# Patient Record
Sex: FEMALE | Race: WHITE | NOT HISPANIC OR LATINO | Employment: FULL TIME | URBAN - METROPOLITAN AREA
[De-identification: names, ages, dates, MRNs, and addresses within clinical notes are randomized per-mention and may not be internally consistent; named-entity substitution may affect disease eponyms.]

---

## 2019-01-31 ENCOUNTER — HISTORICAL (OUTPATIENT)
Dept: ADMINISTRATIVE | Facility: HOSPITAL | Age: 44
End: 2019-01-31

## 2019-01-31 LAB
ABS NEUT (OLG): 4 X10(3)/MCL (ref 2.1–9.2)
ALBUMIN SERPL-MCNC: 4.3 GM/DL (ref 3.4–5)
ALBUMIN/GLOB SERPL: 1.59 {RATIO} (ref 1.5–2.5)
ALP SERPL-CCNC: 26 UNIT/L (ref 38–126)
ALT SERPL-CCNC: 7 UNIT/L (ref 7–52)
APPEARANCE, UA: CLEAR
AST SERPL-CCNC: 21 UNIT/L (ref 15–37)
BACTERIA #/AREA URNS AUTO: NORMAL /HPF
BILIRUB SERPL-MCNC: 0.5 MG/DL (ref 0.2–1)
BILIRUB UR QL STRIP: NEGATIVE MG/DL
BILIRUBIN DIRECT+TOT PNL SERPL-MCNC: 0.1 MG/DL (ref 0–0.5)
BILIRUBIN DIRECT+TOT PNL SERPL-MCNC: 0.4 MG/DL
BUN SERPL-MCNC: 9 MG/DL (ref 7–18)
CALCIUM SERPL-MCNC: 9 MG/DL (ref 8.5–10)
CHLORIDE SERPL-SCNC: 103 MMOL/L (ref 98–107)
CHOLEST SERPL-MCNC: 188 MG/DL (ref 0–200)
CHOLEST/HDLC SERPL: 2.4 {RATIO}
CO2 SERPL-SCNC: 28 MMOL/L (ref 21–32)
COLOR UR: NORMAL
CREAT SERPL-MCNC: 0.79 MG/DL (ref 0.6–1.3)
DEPRECATED CALCIDIOL+CALCIFEROL SERPL-MC: 31.6 NG/ML (ref 30–80)
ERYTHROCYTE [DISTWIDTH] IN BLOOD BY AUTOMATED COUNT: 11.9 % (ref 11.5–17)
GLOBULIN SER-MCNC: 2.7 GM/DL (ref 1.2–3)
GLUCOSE (UA): NEGATIVE MG/DL
GLUCOSE SERPL-MCNC: 98 MG/DL (ref 74–106)
HCT VFR BLD AUTO: 41.3 % (ref 37–47)
HDLC SERPL-MCNC: 80 MG/DL (ref 35–60)
HGB BLD-MCNC: 13.2 GM/DL (ref 12–16)
HGB UR QL STRIP: NEGATIVE UNIT/L
KETONES UR QL STRIP: NEGATIVE MG/DL
LDLC SERPL CALC-MCNC: 90 MG/DL (ref 0–129)
LEUKOCYTE ESTERASE UR QL STRIP: NEGATIVE UNIT/L
LYMPHOCYTES # BLD AUTO: 1.5 X10(3)/MCL (ref 0.6–3.4)
LYMPHOCYTES NFR BLD AUTO: 26.2 % (ref 13–40)
MCH RBC QN AUTO: 29.8 PG (ref 27–31.2)
MCHC RBC AUTO-ENTMCNC: 32 GM/DL (ref 32–36)
MCV RBC AUTO: 93 FL (ref 80–94)
MONOCYTES # BLD AUTO: 0.4 X10(3)/MCL (ref 0.1–1.3)
MONOCYTES NFR BLD AUTO: 7.6 % (ref 0.1–24)
NEUTROPHILS NFR BLD AUTO: 66.2 % (ref 47–80)
NITRITE UR QL STRIP.AUTO: NEGATIVE
PH UR STRIP: 6 [PH]
PLATELET # BLD AUTO: 338 X10(3)/MCL (ref 130–400)
PMV BLD AUTO: 9 FL (ref 9.4–12.4)
POTASSIUM SERPL-SCNC: 4.1 MMOL/L (ref 3.5–5.1)
PROT SERPL-MCNC: 7 GM/DL (ref 6.4–8.2)
PROT UR QL STRIP: NEGATIVE MG/DL
RBC # BLD AUTO: 4.43 X10(6)/MCL (ref 4.2–5.4)
RBC #/AREA URNS HPF: NORMAL /HPF
SODIUM SERPL-SCNC: 136 MMOL/L (ref 136–145)
SP GR UR STRIP: 1.01
SQUAMOUS EPITHELIAL, UA: NORMAL /LPF
TRIGL SERPL-MCNC: 39 MG/DL (ref 30–150)
TSH SERPL-ACNC: 0.63 MIU/ML (ref 0.35–4.94)
UROBILINOGEN UR STRIP-ACNC: 0.2 MG/DL
VLDLC SERPL CALC-MCNC: 7.8 MG/DL
WBC # SPEC AUTO: 5.9 X10(3)/MCL (ref 4.5–11.5)
WBC #/AREA URNS AUTO: NORMAL /[HPF]

## 2021-02-12 ENCOUNTER — HISTORICAL (OUTPATIENT)
Dept: ADMINISTRATIVE | Facility: HOSPITAL | Age: 46
End: 2021-02-12

## 2021-02-12 LAB
ALBUMIN SERPL-MCNC: 4.2 GM/DL (ref 3.4–5)
ALBUMIN/GLOB SERPL: 1.68 {RATIO} (ref 1.5–2.5)
ALP SERPL-CCNC: 31 UNIT/L (ref 38–126)
ALT SERPL-CCNC: 9 UNIT/L (ref 7–52)
AST SERPL-CCNC: 24 UNIT/L (ref 15–37)
BILIRUB SERPL-MCNC: 0.5 MG/DL (ref 0.2–1)
BILIRUBIN DIRECT+TOT PNL SERPL-MCNC: 0.1 MG/DL (ref 0–0.5)
BILIRUBIN DIRECT+TOT PNL SERPL-MCNC: 0.4 MG/DL
BUN SERPL-MCNC: 13 MG/DL (ref 7–18)
CALCIUM SERPL-MCNC: 9.1 MG/DL (ref 8.5–10)
CHLORIDE SERPL-SCNC: 102 MMOL/L (ref 98–107)
CO2 SERPL-SCNC: 27 MMOL/L (ref 21–32)
CREAT SERPL-MCNC: 0.82 MG/DL (ref 0.6–1.3)
DEPRECATED CALCIDIOL+CALCIFEROL SERPL-MC: 66.2 NG/ML (ref 30–80)
GLOBULIN SER-MCNC: 2.5 GM/DL (ref 1.2–3)
GLUCOSE SERPL-MCNC: 96 MG/DL (ref 74–106)
POTASSIUM SERPL-SCNC: 4.6 MMOL/L (ref 3.5–5.1)
PROT SERPL-MCNC: 6.7 GM/DL (ref 6.4–8.2)
SODIUM SERPL-SCNC: 137 MMOL/L (ref 136–145)

## 2022-04-09 ENCOUNTER — HISTORICAL (OUTPATIENT)
Dept: ADMINISTRATIVE | Facility: HOSPITAL | Age: 47
End: 2022-04-09

## 2022-04-25 VITALS
HEIGHT: 61 IN | DIASTOLIC BLOOD PRESSURE: 80 MMHG | BODY MASS INDEX: 29.27 KG/M2 | SYSTOLIC BLOOD PRESSURE: 138 MMHG | WEIGHT: 155 LBS

## 2022-05-02 NOTE — HISTORICAL [facility] CERNER
This is a historical note converted from Suki. Formatting and pictures may have been removed.  Please reference Suki for original formatting and attached multimedia. Chief Complaint  F/U/MED RF  History of Present Illness  Patient here for her wellness. ?Fasting. ?Effexor is working well, feels sick if she misses.  Same occupation, . ?, 3 children. No nicotine, no etoh. Exercises 3-4 times per week. Going to [place] [facility].  Father dec 47 : Lymphoma, Hep B  Mother 64 : Cerebral Aneurysm, Anxiety, HTN  brother 34 : healthy  Declines Flu Vaccine  GYN: [facility]  Review of Systems  General:??????????????? Patient reports energy level is good. Denies weight change. ?Denies fever,chills, night sweats, fatigue or weakness.  Integument:???????? Denies any nevus changes, rashes, urticaria, ?or sores.? Also denies itching or areas of numbness.  HEENT:?????????????????? Denies vision changes or eye pain.? No sore throat, ear pain, sinus pressure or discharge.  Cardiovascular:??? Denies chest pain, palpitations, dyspnea on exertion, orthopnea.  Respiratory:???????? No cough, wheezing, shortness of breath, or sputum.  GI:????????????????????????? Denies nausea, emesis, constipation, diarrhea, melena, hematochezia or abdominal pain  :??????????????????????? No frequency, urgency, hematuria, discharge, or incontinence  MS:?????????????????????? Denies myalgias, arthralgias, joint effusion, edema, or weakness  Neuro:????????????????? No headaches, numbness in extremities, tingling, dizziness, or weakness  Psych:?????????????????? Denies anxiety, depression, suicidal or homicidal ideations, or irritability  Endo:??????????????????? Denies polyuria, polydipsia, polyphagia  Heme:????????????????? No abnormal bleeding or bruising. No lymph node enlargement or pain.  Physical Exam  Vitals & Measurements  HR:?68(Peripheral)? BP:?128/64?  HT:?154.94?cm? WT:?68.6?kg? BMI:?28.58?  General :?????Well-developed and?  nourished, no apparent distress, alert and oriented ?4  Integument :????? Skin is intact with no erythema.? No pustules or vesicles.? No rash or scale. No Lymphadenopathy.? No urticaria.? No abnormal nevi  HEENT :?????ALLAN, EOMI ; TMs and EACs clear, normal turbinates with no erythema, normal mucosa, no sinus tenderness; no erythema or exudate of mouth or pharynx  Neck :?????Supple, no lymphadenopathy, no thyromegaly, no bruits, no jugular venous distention  Cardiovascular :?????? Regular rhythm and rate, no murmurs, radial and dorsal pedal pulses 2+ bilaterally  Respiratory :??????Lungs clear to auscultation bilaterally, no wheezes, no crackles, no rhonchi.? Good air movement  Abdomen :?????? ?NABS, soft, nontender, no hepatosplenomegaly, no masses, no guarding or rebound??????????????????????????  MS :??????? No muscle tenderness, joints WNL, FROM, negative SLR, no?CCE  Neuro :? ?????? CN II-XII intact, reflexes 2+ throughout, no motor sensory deficits, negative?cerebellar? tests  Psych :??????Normal affect, patient calm, good historian, patient answers questions??? appropriately.????Good hygiene? ??  Heme :?????? No bruising or petechiae?  Assessment/Plan  1.?Wellness examination?Z00.00  ?Appropriate wellness topics discussed.? Labs to be done today and follow-up to be determined.? Refilled chronic medications for 6 months except Valium, for it?she was Rx??#30 with 1 refill.  2.?HTN (hypertension)?I10  ?Stable.  3.?Anxiety?F41.9  ?Controlled.  4.?Vitamin D deficiency?E55.9   Problem List/Past Medical History  Ongoing  Anxiety  Chest pain  Fatigue  HTN (hypertension)  Neck pain  Vitamin D deficiency  Wellness examination  Historical  No qualifying data  Medications  busPIRone 10 mg oral tablet, 10 mg= 1 tab(s), Oral, TID, 3 refills  DIAZepam 5 mg oral tablet, 10 mg= 2 tab(s), Oral, TID, 1 refills  fluticasone 50 mcg/inh nasal spray, 2 spray(s), Both Nostrils, Daily, 5 refills  lisinopril 20 mg oral tablet, 20 mg=  1 tab(s), Oral, Daily, 5 refills  LO LOESTRIN FE 1-10 TABLET, 1 tab(s), Oral, Daily  omeprazole 40 mg oral DR capsule, 40 mg= 1 cap(s), Oral, Daily, 5 refills  PHENTERMINE 37.5 MG TABLET, 37.5 mg= 1 tab(s), qAM  venlafaxine 150 mg oral capsule, extended release, 150 mg= 1 cap(s), Oral, Daily, 5 refills  Allergies  No Known Medication Allergies  Social History  Tobacco  Never (less than 100 in lifetime), N/A, 01/31/2019  Family History  Family history is unknown  Health Maintenance  Health Maintenance  ???Pending?(in the next year)  ??? ??OverDue  ??? ? ? ?Diabetes Screening due??and every?  ??? ? ? ?Cervical Cancer Screening due??03/06/17??and every 3??year(s)  ??? ??Due?  ??? ? ? ?ADL Screening due??02/09/19??and every 1??year(s)  ??? ? ? ?Alcohol Misuse Screening due??02/09/19??and every 1??year(s)  ??? ? ? ?Depression Screening due??02/09/19??and every?  ??? ? ? ?Hypertension Management-Education due??02/09/19??and every 1??year(s)  ??? ? ? ?Smoking Cessation due??02/09/19??Variable frequency  ??? ? ? ?Tetanus Vaccine due??02/09/19??and every 10??year(s)  ??? ??Due In Future?  ??? ? ? ?Blood Pressure Screening not due until??01/31/20??and every 1??year(s)  ??? ? ? ?Body Mass Index Check not due until??01/31/20??and every 1??year(s)  ??? ? ? ?Hypertension Management-Blood Pressure not due until??01/31/20??and every 1??year(s)  ??? ? ? ?Hypertension Management-BMP not due until??01/31/20??and every 1??year(s)  ??? ? ? ?Obesity Screening not due until??01/31/20??and every 1??year(s)  ???Satisfied?(in the past 1 year)  ??? ??Satisfied?  ??? ? ? ?Blood Pressure Screening on??01/31/19.??Satisfied by Amisha Kothari LPN  ??? ? ? ?Body Mass Index Check on??01/31/19.??Satisfied by Amisha Kothari LPN  ??? ? ? ?Diabetes Screening on??01/31/19.??Satisfied by Juana Ca  ??? ? ? ?Hypertension Management-BMP on??01/31/19.??Satisfied by Juana Ca  ??? ? ? ?Influenza Vaccine on??01/31/19.??Satisfied by Taye WATTERS,  Amisha  ??? ? ? ?Obesity Screening on??01/31/19.??Satisfied by Amisha Kothari LPN  ?  ?